# Patient Record
Sex: MALE | Race: BLACK OR AFRICAN AMERICAN | NOT HISPANIC OR LATINO | URBAN - METROPOLITAN AREA
[De-identification: names, ages, dates, MRNs, and addresses within clinical notes are randomized per-mention and may not be internally consistent; named-entity substitution may affect disease eponyms.]

---

## 2020-08-23 ENCOUNTER — EMERGENCY (EMERGENCY)
Facility: HOSPITAL | Age: 50
LOS: 1 days | Discharge: HOME | End: 2020-08-23
Attending: EMERGENCY MEDICINE | Admitting: EMERGENCY MEDICINE
Payer: MEDICARE

## 2020-08-23 VITALS
DIASTOLIC BLOOD PRESSURE: 81 MMHG | TEMPERATURE: 100 F | SYSTOLIC BLOOD PRESSURE: 145 MMHG | RESPIRATION RATE: 18 BRPM | WEIGHT: 250 LBS | OXYGEN SATURATION: 100 % | HEART RATE: 80 BPM

## 2020-08-23 VITALS
DIASTOLIC BLOOD PRESSURE: 87 MMHG | SYSTOLIC BLOOD PRESSURE: 133 MMHG | HEART RATE: 71 BPM | OXYGEN SATURATION: 99 % | TEMPERATURE: 99 F | RESPIRATION RATE: 17 BRPM

## 2020-08-23 DIAGNOSIS — F20.89 OTHER SCHIZOPHRENIA: ICD-10-CM

## 2020-08-23 LAB
ALBUMIN SERPL ELPH-MCNC: 4 G/DL — SIGNIFICANT CHANGE UP (ref 3.3–5)
ALP SERPL-CCNC: 79 U/L — SIGNIFICANT CHANGE UP (ref 40–120)
ALT FLD-CCNC: 41 U/L — SIGNIFICANT CHANGE UP (ref 10–45)
ANION GAP SERPL CALC-SCNC: 12 MMOL/L — SIGNIFICANT CHANGE UP (ref 5–17)
APAP SERPL-MCNC: <5 UG/ML — LOW (ref 10–30)
APPEARANCE UR: CLEAR — SIGNIFICANT CHANGE UP
AST SERPL-CCNC: 21 U/L — SIGNIFICANT CHANGE UP (ref 10–40)
BASOPHILS # BLD AUTO: 0.03 K/UL — SIGNIFICANT CHANGE UP (ref 0–0.2)
BASOPHILS NFR BLD AUTO: 0.4 % — SIGNIFICANT CHANGE UP (ref 0–2)
BILIRUB SERPL-MCNC: 0.4 MG/DL — SIGNIFICANT CHANGE UP (ref 0.2–1.2)
BILIRUB UR-MCNC: NEGATIVE — SIGNIFICANT CHANGE UP
BUN SERPL-MCNC: 14 MG/DL — SIGNIFICANT CHANGE UP (ref 7–23)
CALCIUM SERPL-MCNC: 8.9 MG/DL — SIGNIFICANT CHANGE UP (ref 8.4–10.5)
CHLORIDE SERPL-SCNC: 98 MMOL/L — SIGNIFICANT CHANGE UP (ref 96–108)
CO2 SERPL-SCNC: 23 MMOL/L — SIGNIFICANT CHANGE UP (ref 22–31)
COLOR SPEC: YELLOW — SIGNIFICANT CHANGE UP
CREAT SERPL-MCNC: 0.95 MG/DL — SIGNIFICANT CHANGE UP (ref 0.5–1.3)
DIFF PNL FLD: NEGATIVE — SIGNIFICANT CHANGE UP
EOSINOPHIL # BLD AUTO: 0.04 K/UL — SIGNIFICANT CHANGE UP (ref 0–0.5)
EOSINOPHIL NFR BLD AUTO: 0.6 % — SIGNIFICANT CHANGE UP (ref 0–6)
ETHANOL SERPL-MCNC: <10 MG/DL — SIGNIFICANT CHANGE UP (ref 0–10)
GLUCOSE SERPL-MCNC: 152 MG/DL — HIGH (ref 70–99)
GLUCOSE UR QL: NEGATIVE — SIGNIFICANT CHANGE UP
HCT VFR BLD CALC: 46.1 % — SIGNIFICANT CHANGE UP (ref 39–50)
HGB BLD-MCNC: 14.9 G/DL — SIGNIFICANT CHANGE UP (ref 13–17)
IMM GRANULOCYTES NFR BLD AUTO: 0.4 % — SIGNIFICANT CHANGE UP (ref 0–1.5)
KETONES UR-MCNC: NEGATIVE — SIGNIFICANT CHANGE UP
LEUKOCYTE ESTERASE UR-ACNC: NEGATIVE — SIGNIFICANT CHANGE UP
LYMPHOCYTES # BLD AUTO: 2.49 K/UL — SIGNIFICANT CHANGE UP (ref 1–3.3)
LYMPHOCYTES # BLD AUTO: 36.1 % — SIGNIFICANT CHANGE UP (ref 13–44)
MCHC RBC-ENTMCNC: 31 PG — SIGNIFICANT CHANGE UP (ref 27–34)
MCHC RBC-ENTMCNC: 32.3 GM/DL — SIGNIFICANT CHANGE UP (ref 32–36)
MCV RBC AUTO: 95.8 FL — SIGNIFICANT CHANGE UP (ref 80–100)
MONOCYTES # BLD AUTO: 0.48 K/UL — SIGNIFICANT CHANGE UP (ref 0–0.9)
MONOCYTES NFR BLD AUTO: 7 % — SIGNIFICANT CHANGE UP (ref 2–14)
NEUTROPHILS # BLD AUTO: 3.82 K/UL — SIGNIFICANT CHANGE UP (ref 1.8–7.4)
NEUTROPHILS NFR BLD AUTO: 55.5 % — SIGNIFICANT CHANGE UP (ref 43–77)
NITRITE UR-MCNC: NEGATIVE — SIGNIFICANT CHANGE UP
NRBC # BLD: 0 /100 WBCS — SIGNIFICANT CHANGE UP (ref 0–0)
PCP SPEC-MCNC: SIGNIFICANT CHANGE UP
PH UR: 6 — SIGNIFICANT CHANGE UP (ref 5–8)
PLATELET # BLD AUTO: 145 K/UL — LOW (ref 150–400)
POTASSIUM SERPL-MCNC: 4 MMOL/L — SIGNIFICANT CHANGE UP (ref 3.5–5.3)
POTASSIUM SERPL-SCNC: 4 MMOL/L — SIGNIFICANT CHANGE UP (ref 3.5–5.3)
PROT SERPL-MCNC: 7.2 G/DL — SIGNIFICANT CHANGE UP (ref 6–8.3)
PROT UR-MCNC: NEGATIVE MG/DL — SIGNIFICANT CHANGE UP
RBC # BLD: 4.81 M/UL — SIGNIFICANT CHANGE UP (ref 4.2–5.8)
RBC # FLD: 11.6 % — SIGNIFICANT CHANGE UP (ref 10.3–14.5)
SALICYLATES SERPL-MCNC: <0.3 MG/DL — LOW (ref 2.8–20)
SARS-COV-2 RNA SPEC QL NAA+PROBE: SIGNIFICANT CHANGE UP
SODIUM SERPL-SCNC: 133 MMOL/L — LOW (ref 135–145)
SP GR SPEC: 1.02 — SIGNIFICANT CHANGE UP (ref 1–1.03)
UROBILINOGEN FLD QL: 0.2 E.U./DL — SIGNIFICANT CHANGE UP
WBC # BLD: 6.89 K/UL — SIGNIFICANT CHANGE UP (ref 3.8–10.5)
WBC # FLD AUTO: 6.89 K/UL — SIGNIFICANT CHANGE UP (ref 3.8–10.5)

## 2020-08-23 PROCEDURE — 80307 DRUG TEST PRSMV CHEM ANLYZR: CPT

## 2020-08-23 PROCEDURE — 36415 COLL VENOUS BLD VENIPUNCTURE: CPT

## 2020-08-23 PROCEDURE — 99285 EMERGENCY DEPT VISIT HI MDM: CPT

## 2020-08-23 PROCEDURE — 85025 COMPLETE CBC W/AUTO DIFF WBC: CPT

## 2020-08-23 PROCEDURE — 87635 SARS-COV-2 COVID-19 AMP PRB: CPT

## 2020-08-23 PROCEDURE — 99284 EMERGENCY DEPT VISIT MOD MDM: CPT | Mod: 25

## 2020-08-23 PROCEDURE — 81003 URINALYSIS AUTO W/O SCOPE: CPT

## 2020-08-23 PROCEDURE — 80053 COMPREHEN METABOLIC PANEL: CPT

## 2020-08-23 NOTE — ED PROVIDER NOTE - CLINICAL SUMMARY MEDICAL DECISION MAKING FREE TEXT BOX
48 yo m with pmh of htn, dm, schizophrenia c/o SI x 2 weeks. Reports feeling depressed because he is bored and has nothing to do. Pt stopped taking his haldol and wellbutrin 3 weeks ago because he did not feel like they were doing anything for him. Reports plan would be to cut himself. VSS. Well appearing. PE unremarkable. Labs, psyc eval.

## 2020-08-23 NOTE — ED BEHAVIORAL HEALTH NOTE - BEHAVIORAL HEALTH NOTE
PRE-HOSPITAL COURSE:  SOURCE:  Second-hand information via EMR documentation.   DETAILS: Patient self-presented to the ED with no noted incidents.  ===================  ED COURSE:  SOURCE:  Second-hand information via EMR documentation.  ARRIVAL:  Patient self-presented to the ED with no noted incidents.   BELONGINGS:  Clothing; nothing of note in belongings.   BEHAVIOR:  Upon arrival patient reported feeling increasingly depressed since he is home not working or doing anything all day. Patient endorsed SI with plan to cut himself, which he has done in the past. Patient is noted to appear depressed, is ambulating w/o assistance, is AXO4, judgement/insight/memory/speech/thought process WNL, presents with fair hygiene/grooming. Complied with triage protocols – provided blood/urine, changed into a hospital gown and allowed for wanding without incident. No behavioral issues reported.   ========================  COLLATERAL   ========================  NAME: Jacoby George  NUMBER: [phone]  RELATIONSHIP:  Brother  COMMENTS:  Made an attempt to contact brother at number provided with no success, left a voicemail requesting a phone call back.

## 2020-08-23 NOTE — ED BEHAVIORAL HEALTH ASSESSMENT NOTE - RISK ASSESSMENT
chronic elevated risk factors are history of psychotic illness, history of hospitalization, history of suicide attempt, and social isolation, acute risk factors are current med non-adherence, vague suicidal thought earlier today, however protective factors are eagerness to re-engage in care, ability to safety plan, future orientation in both short term and long term, lack of currently professed suicidal ideation. Low Acute Suicide Risk

## 2020-08-23 NOTE — ED PROVIDER NOTE - ATTENDING CONTRIBUTION TO CARE
as per HPI. Vitals wnl, exam as above.   In ED: mild hyponatremia, other labs grossly wnl. Evaluated by psych.  Cleared by psych.   Clinically no indication for further emergent ED workup or hospitalization at this time. Comfortable for dc, outpt f/u.

## 2020-08-23 NOTE — ED BEHAVIORAL HEALTH ASSESSMENT NOTE - OTHER PAST PSYCHIATRIC HISTORY (INCLUDE DETAILS REGARDING ONSET, COURSE OF ILLNESS, INPATIENT/OUTPATIENT TREATMENT)
>10 lifetime hospitalizations, most recently [facility] in 2020  1 SA in 2006 by hanging  NSSIB as recently as last year  Current outpt treatment with [facility] at [address]

## 2020-08-23 NOTE — ED BEHAVIORAL HEALTH ASSESSMENT NOTE - SAFETY PLAN DETAILS
discussed coping strategies with patient including to return to the emergnecy room immediately if suicidal thoughts recur. patient is in agreement with plan.

## 2020-08-23 NOTE — ED BEHAVIORAL HEALTH ASSESSMENT NOTE - SUMMARY
Patient is a 49y man, single, noncaregiver, domiciled in private residence with roommate, PMHx HTN, HLD, DM, Asthma, PPHx of schizoaffective disorder, >10 lifetime hospitalizations, most recently [facility] in 2020, who has 1 past suicide attempt in 2006, past NSSIB, no known legal/violence history, who self presented today with subjective depression, boredom and a suicidal thought. On exam he very clearly identifies boredom and social isolation as his primary issues, and while noting he had a fleeting suicidal thought earlier today, no longer feels suicidal and was able to distract himself. He is future oriented in both short and long term, plans to go home tonight and take medication and re-engage in outpatient care. He is planning to  his [employer] badge to return to work. Given that his self-professed main issue right now is feeling useless and bored, hospitalization would actually be likely to do more harm than good, as it could potentially disrupt his plans to do work for the [employer]. Additionally, he has a very stable mental status with a euthymic albeit blunted affect, no evidence of psychosis, no responding to internal stimuli, and currently denying low mood and suicidal ideation.

## 2020-08-23 NOTE — ED PROVIDER NOTE - PATIENT PORTAL LINK FT
You can access the FollowMyHealth Patient Portal offered by [facility] by registering at the following website: [email or web address]. By joining [employer]’s FollowMyHealth portal, you will also be able to view your health information using other applications (apps) compatible with our system.

## 2020-08-23 NOTE — ED PROVIDER NOTE - NSFOLLOWUPINSTRUCTIONS_ED_ALL_ED_FT
Depression    WHAT YOU NEED TO KNOW:    Depression is a medical condition that causes feelings of sadness or hopelessness that do not go away. Depression may cause you to lose interest in things you used to enjoy. These feelings may interfere with your daily life.    DISCHARGE INSTRUCTIONS:    Call your local emergency number (911 in the ) if:     You think about harming yourself or someone else.      You have done something on purpose to hurt yourself.    Call your therapist or doctor if:     Your symptoms do not improve.      You cannot make it to your next appointment.       You have new symptoms.      You have questions or concerns about your condition or care.    The following resources are available at any time to help you, if needed:     National Suicide Prevention Lifeline: 1-107.675.7739 (8-201-160-TALK)      Suicide Hotline: 1-254.327.4880 (5-021-PHKRIOL)      For a list of international numbers: https://[employer].org/find-help/international-resources/    Medicines:     Antidepressants may be given to improve or balance your mood. You may need to take this medicine for several weeks before you begin to feel better.      Take your medicine as directed. Contact your healthcare provider if you think your medicine is not helping or if you have side effects. Tell him of her if you are allergic to any medicine. Keep a list of the medicines, vitamins, and herbs you take. Include the amounts, and when and why you take them. Bring the list or the pill bottles to follow-up visits. Carry your medicine list with you in case of an emergency.    Therapy is often used together with medicine to relieve depression. Therapy is a way for you to talk about your feelings and anything that may be causing depression. Therapy can be done alone or in a group. It may also be done with family members or a significant other.    Self-care:     Get regular physical activity. Try to be active for 30 minutes, 3 to 5 days a week. Physical activity can help relieve depression. Work with your healthcare provider to develop a plan that you enjoy. It may help to ask someone to be active with you.      Create a regular sleep schedule. A routine can help you relax before bed. Listen to music, read, or do yoga. Try to go to bed and wake up at the same time every day. Sleep is important for emotional health.      Eat a variety of healthy foods. Healthy foods include fruits, vegetables, whole-grain breads, low-fat dairy products, lean meats, fish, and cooked beans. A healthy meal plan is low in fat, salt, and added sugar.      Do not drink alcohol or use drugs. Alcohol and drugs can make depression worse. Talk to your therapist or doctor if you need help quitting.    Follow up with your healthcare provider as directed: Your healthcare provider will monitor your progress at follow-up visits. He or she will also monitor your medicine if you take antidepressants. Your healthcare provider will ask if the medicine is helping. Tell him or her about any side effects or problems you may have with your medicine. The type or amount of medicine may need to be changed. Write down your questions so you remember to ask them during your visits.

## 2020-08-23 NOTE — ED BEHAVIORAL HEALTH ASSESSMENT NOTE - HPI (INCLUDE ILLNESS QUALITY, SEVERITY, DURATION, TIMING, CONTEXT, MODIFYING FACTORS, ASSOCIATED SIGNS AND SYMPTOMS)
Patient is a 49y man, single, noncaregiver, domiciled in private residence with roommate, PMHx HTN, HLD, DM, Asthma, PPHx of schizoaffective disorder, >10 lifetime hospitalizations, most recently [facility] in 2020, who has 1 past suicide attempt in 2006, past NSSIB, no known legal/violence history, who self presented today with subjective depression, boredom and a suicidal thought.     Baseline, patient takes haldol 10mg and wellbutrin 300mg and follows with [facility] at [address] [place] [address] with Ms. Dasia Aburto. He says that about 3 months ago, he stopped taking his medication, and says he "doesn't know" why. He says prior to this he felt "alright", but afterward slowly started experiencing depressive symptoms. He said he was feeling depressed, started isolating himself, doesn't socialize much, and feels useless because he wants to be working. He says he sometimes has hard time falling asleep and eats more takeout. He has had occasional suicidal thoughts. One he had about 3-4 weeks ago. He reports going to [facility] ED at that time, and was discharged. Afterward, he followed up with his psychiatrist by phone and refills of his medications were brought to his house. He is not sure why he didn't resume taking them. Today, he said that he was in his kitchen holding a knife when he had a brief thought of cutting himself (he engaged in NSSIB by cutting last year, according to him), but was able to distract himself from the thought, put the knife back, and presented to the emergency room. At this point, he clearly says, "I'm just bored and I need something to do." He mentions that he is supposed to be doing work for the [employer] and has already completed a new orientation and just needs to  a badge tomorrow so he can begin work. He is eager to do this. He no longer feels any suicidal ideation, no homicidal ideation, no auditory or visual hallucinations. We discuss how he has full medication bottles at home and he plans to take a haldol as soon as he gets home tonight. He is looking forward to picking up his [email or web address] badge tomorrow. We discussed voluntary admission but he declined, preferring to re-engage in outpatient treatment.

## 2020-08-23 NOTE — ED ADULT NURSE NOTE - OBJECTIVE STATEMENT
pt a&ox3 resting comfortably in lounge chair. pt c/o SI x 2 weeks.  hx of schizophrenia. pt reports increase in feeling depressed as he is home and not working/ doing anything, reports that he is feeling like killing himself. reports plan would be to cut himself. reports prior attempt where he cut himself in the chest. prescribed welbutrin and haldol that pt reports is delivered by a visiting nurse, but he hasn't taken them in 3 weeks because he doesn't feel they are helping him.  c/o not being able to sleep. reports family as his support system, and that he lives with roommates in an apartment. denies sob, cp, n ,v d,fevers, cough, HA, weakness, Pain. denies etoh use/ drug use.,denies auditory and visual hallucinations

## 2020-08-23 NOTE — ED PROVIDER NOTE - OBJECTIVE STATEMENT
50 yo m with pmh of htn, dm, schizophrenia c/o SI x 2 weeks. Reports feeling depressed because he is bored and has nothing to do. Pt stopped taking his haldol and wellbutrin 3 weeks ago because he did not feel like they were doing anything for him. Reports plan would be to cut himself. Pt states he attempted in the past and cut his chest. Does not sleep well. Pt lives with roommates and has support from his family. Denies fever, chills, cp, sob, ha, abd pain. Denies etoh or drug use. 50 yo m with pmh of htn, dm, schizophrenia c/o SI x 2 weeks. Reports feeling depressed because he is bored and has nothing to do. Pt stopped taking his haldol and wellbutrin 3 weeks ago because he did not feel like they were doing anything for him. Reports plan would be to cut himself. Pt states he attempted in the past and cut his chest. Does not sleep well. Pt lives with roommates and has support from his family. Denies fever, chills, cp, sob, ha, abd pain. Denies etoh or drug use. Denies AH/VH.

## 2020-08-27 DIAGNOSIS — R45.851 SUICIDAL IDEATIONS: ICD-10-CM

## 2020-08-27 DIAGNOSIS — Z20.828 CONTACT WITH AND (SUSPECTED) EXPOSURE TO OTHER VIRAL COMMUNICABLE DISEASES: ICD-10-CM

## 2020-08-27 DIAGNOSIS — F32.9 MAJOR DEPRESSIVE DISORDER, SINGLE EPISODE, UNSPECIFIED: ICD-10-CM

## 2020-09-14 ENCOUNTER — EMERGENCY (EMERGENCY)
Facility: HOSPITAL | Age: 50
LOS: 1 days | Discharge: HOME | End: 2020-09-14
Attending: EMERGENCY MEDICINE | Admitting: EMERGENCY MEDICINE
Payer: MEDICARE

## 2020-09-14 VITALS
WEIGHT: 300.05 LBS | OXYGEN SATURATION: 100 % | DIASTOLIC BLOOD PRESSURE: 85 MMHG | HEART RATE: 74 BPM | TEMPERATURE: 98 F | RESPIRATION RATE: 18 BRPM | SYSTOLIC BLOOD PRESSURE: 133 MMHG | HEIGHT: 72 IN

## 2020-09-14 DIAGNOSIS — F25.1 SCHIZOAFFECTIVE DISORDER, DEPRESSIVE TYPE: ICD-10-CM

## 2020-09-14 DIAGNOSIS — R45.851 SUICIDAL IDEATIONS: ICD-10-CM

## 2020-09-14 DIAGNOSIS — Z20.828 CONTACT WITH AND (SUSPECTED) EXPOSURE TO OTHER VIRAL COMMUNICABLE DISEASES: ICD-10-CM

## 2020-09-14 DIAGNOSIS — F32.9 MAJOR DEPRESSIVE DISORDER, SINGLE EPISODE, UNSPECIFIED: ICD-10-CM

## 2020-09-14 LAB
AMPHET UR-MCNC: NEGATIVE — SIGNIFICANT CHANGE UP
ANION GAP SERPL CALC-SCNC: 11 MMOL/L — SIGNIFICANT CHANGE UP (ref 5–17)
APPEARANCE UR: CLEAR — SIGNIFICANT CHANGE UP
BARBITURATES UR SCN-MCNC: NEGATIVE — SIGNIFICANT CHANGE UP
BASOPHILS # BLD AUTO: 0.03 K/UL — SIGNIFICANT CHANGE UP (ref 0–0.2)
BASOPHILS NFR BLD AUTO: 0.5 % — SIGNIFICANT CHANGE UP (ref 0–2)
BENZODIAZ UR-MCNC: NEGATIVE — SIGNIFICANT CHANGE UP
BILIRUB UR-MCNC: NEGATIVE — SIGNIFICANT CHANGE UP
BUN SERPL-MCNC: 12 MG/DL — SIGNIFICANT CHANGE UP (ref 7–23)
CALCIUM SERPL-MCNC: 9.5 MG/DL — SIGNIFICANT CHANGE UP (ref 8.4–10.5)
CHLORIDE SERPL-SCNC: 103 MMOL/L — SIGNIFICANT CHANGE UP (ref 96–108)
CO2 SERPL-SCNC: 27 MMOL/L — SIGNIFICANT CHANGE UP (ref 22–31)
COCAINE METAB.OTHER UR-MCNC: NEGATIVE — SIGNIFICANT CHANGE UP
COLOR SPEC: YELLOW — SIGNIFICANT CHANGE UP
CREAT SERPL-MCNC: 1 MG/DL — SIGNIFICANT CHANGE UP (ref 0.5–1.3)
DIFF PNL FLD: NEGATIVE — SIGNIFICANT CHANGE UP
EOSINOPHIL # BLD AUTO: 0.02 K/UL — SIGNIFICANT CHANGE UP (ref 0–0.5)
EOSINOPHIL NFR BLD AUTO: 0.3 % — SIGNIFICANT CHANGE UP (ref 0–6)
ETHANOL SERPL-MCNC: <10 MG/DL — SIGNIFICANT CHANGE UP (ref 0–10)
GLUCOSE SERPL-MCNC: 95 MG/DL — SIGNIFICANT CHANGE UP (ref 70–99)
GLUCOSE UR QL: NEGATIVE — SIGNIFICANT CHANGE UP
HCT VFR BLD CALC: 50.4 % — HIGH (ref 39–50)
HGB BLD-MCNC: 16.1 G/DL — SIGNIFICANT CHANGE UP (ref 13–17)
IMM GRANULOCYTES NFR BLD AUTO: 0.3 % — SIGNIFICANT CHANGE UP (ref 0–1.5)
KETONES UR-MCNC: NEGATIVE — SIGNIFICANT CHANGE UP
LEUKOCYTE ESTERASE UR-ACNC: NEGATIVE — SIGNIFICANT CHANGE UP
LYMPHOCYTES # BLD AUTO: 2.09 K/UL — SIGNIFICANT CHANGE UP (ref 1–3.3)
LYMPHOCYTES # BLD AUTO: 35.5 % — SIGNIFICANT CHANGE UP (ref 13–44)
MCHC RBC-ENTMCNC: 30.6 PG — SIGNIFICANT CHANGE UP (ref 27–34)
MCHC RBC-ENTMCNC: 31.9 GM/DL — LOW (ref 32–36)
MCV RBC AUTO: 95.8 FL — SIGNIFICANT CHANGE UP (ref 80–100)
METHADONE UR-MCNC: NEGATIVE — SIGNIFICANT CHANGE UP
MONOCYTES # BLD AUTO: 0.53 K/UL — SIGNIFICANT CHANGE UP (ref 0–0.9)
MONOCYTES NFR BLD AUTO: 9 % — SIGNIFICANT CHANGE UP (ref 2–14)
NEUTROPHILS # BLD AUTO: 3.2 K/UL — SIGNIFICANT CHANGE UP (ref 1.8–7.4)
NEUTROPHILS NFR BLD AUTO: 54.4 % — SIGNIFICANT CHANGE UP (ref 43–77)
NITRITE UR-MCNC: NEGATIVE — SIGNIFICANT CHANGE UP
NRBC # BLD: 0 /100 WBCS — SIGNIFICANT CHANGE UP (ref 0–0)
OPIATES UR-MCNC: NEGATIVE — SIGNIFICANT CHANGE UP
PCP SPEC-MCNC: SIGNIFICANT CHANGE UP
PCP UR-MCNC: NEGATIVE — SIGNIFICANT CHANGE UP
PH UR: 6 — SIGNIFICANT CHANGE UP (ref 5–8)
PLATELET # BLD AUTO: 165 K/UL — SIGNIFICANT CHANGE UP (ref 150–400)
POTASSIUM SERPL-MCNC: 4.2 MMOL/L — SIGNIFICANT CHANGE UP (ref 3.5–5.3)
POTASSIUM SERPL-SCNC: 4.2 MMOL/L — SIGNIFICANT CHANGE UP (ref 3.5–5.3)
PROT UR-MCNC: NEGATIVE MG/DL — SIGNIFICANT CHANGE UP
RBC # BLD: 5.26 M/UL — SIGNIFICANT CHANGE UP (ref 4.2–5.8)
RBC # FLD: 11.9 % — SIGNIFICANT CHANGE UP (ref 10.3–14.5)
SARS-COV-2 RNA SPEC QL NAA+PROBE: SIGNIFICANT CHANGE UP
SODIUM SERPL-SCNC: 141 MMOL/L — SIGNIFICANT CHANGE UP (ref 135–145)
SP GR SPEC: 1.02 — SIGNIFICANT CHANGE UP (ref 1–1.03)
THC UR QL: NEGATIVE — SIGNIFICANT CHANGE UP
UROBILINOGEN FLD QL: 0.2 E.U./DL — SIGNIFICANT CHANGE UP
WBC # BLD: 5.89 K/UL — SIGNIFICANT CHANGE UP (ref 3.8–10.5)
WBC # FLD AUTO: 5.89 K/UL — SIGNIFICANT CHANGE UP (ref 3.8–10.5)

## 2020-09-14 PROCEDURE — 90792 PSYCH DIAG EVAL W/MED SRVCS: CPT | Mod: 95

## 2020-09-14 PROCEDURE — 80048 BASIC METABOLIC PNL TOTAL CA: CPT

## 2020-09-14 PROCEDURE — 85025 COMPLETE CBC W/AUTO DIFF WBC: CPT

## 2020-09-14 PROCEDURE — 80307 DRUG TEST PRSMV CHEM ANLYZR: CPT

## 2020-09-14 PROCEDURE — 93010 ELECTROCARDIOGRAM REPORT: CPT

## 2020-09-14 PROCEDURE — 36415 COLL VENOUS BLD VENIPUNCTURE: CPT

## 2020-09-14 PROCEDURE — 99285 EMERGENCY DEPT VISIT HI MDM: CPT

## 2020-09-14 PROCEDURE — 81003 URINALYSIS AUTO W/O SCOPE: CPT

## 2020-09-14 PROCEDURE — 93005 ELECTROCARDIOGRAM TRACING: CPT

## 2020-09-14 NOTE — ED PROVIDER NOTE - PSYCHIATRIC, MLM
Notes plan to harm himself/commit suicide, on 1-1. Alert and oriented to person, place, time/situation. normal mood and affect.

## 2020-09-14 NOTE — ED PROVIDER NOTE - CLINICAL SUMMARY MEDICAL DECISION MAKING FREE TEXT BOX
48 y/o M with PMHx HTN DM schizoaffective disorder, noncompliant with medications here with SI and depression, specific plans for SI. Will get labs, consult psych. 50 y/o M with PMHx HTN DM schizoaffective disorder, noncompliant with medications here with SI and depression, specific plans for SI. Will get labs, consult psych- call to [facility] at 8:23- they are doing "hand-off" and are unavailable for 30 minutes 48 y/o M with PMHx HTN DM schizoaffective disorder, noncompliant with medications here with SI and depression, specific plans for SI. Will get labs, consult psych- signout to Dr Freitas- telepsych at 850- they will reeval 48 y/o M with PMHx HTN DM schizoaffective disorder, noncompliant with medications here with SI and depression, specific plans for SI. Will get labs, consult psych- signout to Dr Freitas- telepsych at 850- they will reeval  accepted by Psych - will need a bed in the morning 50 y/o M with PMHx HTN DM schizoaffective disorder, noncompliant with medications here with SI and depression, specific plans for SI. Will get labs, consult psych- signout to Dr Freitas- telepsych at 850- they will reeval  accepted by Psych - will need a bed in the morning    Patient is stable and cooperative. He was accepted by [facility] and transferred.

## 2020-09-14 NOTE — ED BEHAVIORAL HEALTH ASSESSMENT NOTE - HPI (INCLUDE ILLNESS QUALITY, SEVERITY, DURATION, TIMING, CONTEXT, MODIFYING FACTORS, ASSOCIATED SIGNS AND SYMPTOMS)
Patient is a 49y man, single, noncaregiver, domiciled in private residence with roommate, PMHx HTN, HLD, DM, Asthma, PPHx of schizoaffective disorder, >10 lifetime hospitalizations, most recently [facility] in 2020, who has 1 past suicide attempt in 2006, past NSSIB, no known legal/violence history, who self presented today with subjective depression and an unsuccessful suicide attempt 1 week ago.    Pt last seen 8/23 in this emergency room, at which point he described relapse of some depressive symptoms in the setting of medication non-adherence, but he was excited to start a job at the [employer] and requested discharge so he could restart meds at home and begin work. He was discharged that night. He said the next day he went to  his [employer] badge, but "I wasn't doing anything so I quit". He was disappointed that the job didn't work out. He said that since that time he has been very depressed because he wants something to do every day, like work or school. He attempted suicide by suffocation about 1 week ago. He said that in his bedroom at home, he put a bag over his head and a belt around his neck and tightened it. He laid there for about 15 minutes at which point he said, "it wasn't working because I was still alive", so he gave up. He had other thoughts of suicide method in the past two weeks such as drinking bleach or cutting himself with a knife. His sleep/wake schedule has been reversed. He is up all night, he says he looks out the window and thinks of jumping. When I ask what stops him he concretely answers "A gate on the window." He said at one point he went to [facility] ED but was discharged. He did have one phone call with his outpatient psychiatrist but did not disclose his suicide attempt saying "I didn't tell them because I did not want them to put me back on AOT."     Here in the emergency room, he remains suicidal. In fact he says , "I was thinking about locking myself in the bathroom and putting the bag over my head for patient belongings, but I figured they would call security on me." He feels more secure with 1:1 observation and does not think he can be left alone. He reports AH last week ("you're a loser"), but denies AH/VH today. Denies HI. Denies drugs/etoh.

## 2020-09-14 NOTE — ED BEHAVIORAL HEALTH ASSESSMENT NOTE - OTHER PAST PSYCHIATRIC HISTORY (INCLUDE DETAILS REGARDING ONSET, COURSE OF ILLNESS, INPATIENT/OUTPATIENT TREATMENT)
> 10 lifetime hospitalizations, most recently [facility] in 2020  1 SA in 2006 by hanging  Reported unsuccessful SA by suffocation last week, 9/2020  NSSIB as recently as last year 2019  Outpt treatment at [facility] [address]

## 2020-09-14 NOTE — ED ADULT NURSE NOTE - HPI (INCLUDE ILLNESS QUALITY, SEVERITY, DURATION, TIMING, CONTEXT, MODIFYING FACTORS, ASSOCIATED SIGNS AND SYMPTOMS)
Patient presents AOX4 c/o SI, stating "I don't want to live". Patient reports "I have not taken my medications in months because I don't want to live". Denies AH/VH/HI. Speaking in full, complete sentences. Appropriate eye contact made. Patient sites lack of work/finding a job to be a major stressor. Patient reports his plan would be to suffocate himself. Denies ETOH/drug use.

## 2020-09-14 NOTE — ED BEHAVIORAL HEALTH NOTE - BEHAVIORAL HEALTH NOTE
===================  PRE-HOSPITAL COURSE  ===================  SOURCE:  Triage documentation.   DETAILS:  Patient presented self to ED; chief complaint of SI with plan to suffocate self.     ============  ED COURSE   ============  SOURCE:  Triage/nurse documentation.   ARRIVAL:  Patient was calm and cooperative upon arrival and allowed for gowning/wanding without noted incident. Patient presents with good hygeine/grooming.   BELONGINGS:  None notable, clothing stowed with security.   BEHAVIOR: Patient has been calm and cooperative while in ED and has provided blood/urine without incident. Patient endorses SI and plan to suffocate self, denies HI/AH/VH. Major stressor in life is inability to secure work. Patient’s speech is of normal volume/rate accompanied by a logical thought process; patient is AOx4. Patient makes appropriate eye contact.   TREATMENT:  Patient has not required medication intervention while in ED.   VISITORS:  Patient unaccompanied by social supports while in ED.       COVID Exposure Screen- collateral (i.e. third-party, chart review, belongings, etc; include EMS and ED staff)     1.        *In the past 14 days, has the patient been around anyone with a positive COVID-19 test?*  (  ) Yes   ( X) No   (  ) Unknown- Reason (e.g. collateral uncertain, refusing to answer, etc.):  ______  IF YES PROCEED TO QUESTION #2. IF NO or UNKNOWN, PLEASE SKIP TO QUESTION #7  2.        Was the patient within 6 feet of them for at least 15 minutes? (  ) Yes   (  ) No   (  ) Unknown- Reason: ______   3.        Did the patient provide care for them? (  ) Yes   (  ) No   (  ) Unknown- Reason: ______   4.        Did the patient have direct physical contact with them (touched, hugged, or kissed them)? (  ) Yes   (  ) No    (  ) Unknown- Reason: ______   5.        Did the patient share eating or drinking utensils with them? (  ) Yes   (  ) No    (  ) Unknown- Reason: ______   6.        Have they sneezed, coughed, or somehow got respiratory droplets on the patient? (  ) Yes   (  ) No    (  ) Unknown- Reason: ______   7.        *Has the patient been out of [place] within the past 14 days?*  (  ) Yes   ( X) No   (  ) Unknown- Reason (e.g. collateral uncertain, refusing to answer, etc.): _______  IF YES PLEASE ANSWER THE FOLLOWING QUESTIONS:  8.        Which state/country has the patient been to? ______   9.        Was the patient there over 24 hours? (  ) Yes   (  ) No    (  ) Unknown- Reason: ______   10.     What date did the patient return to [facility]? ______

## 2020-09-14 NOTE — ED PROVIDER NOTE - OBJECTIVE STATEMENT
48 y/o M with PMHx of schizoaffective disorder, HTN, HLD, noncompliant with medications (notes not taken for a few weeks), presents to the ED with suicidal ideations for more than a month, thoughts of harming himself, depression. Patient notes that he wants to hurt himself by drowning and shows scars of where he cut himself. Lives with roommates, which he is friendly with, currently unemployed. No HI, denies current hallucinations, or any physical complaints.

## 2020-09-15 ENCOUNTER — INPATIENT (INPATIENT)
Facility: HOSPITAL | Age: 50
LOS: 2 days | Discharge: HOME | End: 2020-09-18
Attending: PSYCHIATRY & NEUROLOGY | Admitting: PSYCHIATRY & NEUROLOGY
Payer: MEDICARE

## 2020-09-15 VITALS
HEART RATE: 70 BPM | RESPIRATION RATE: 17 BRPM | OXYGEN SATURATION: 98 % | TEMPERATURE: 98 F | SYSTOLIC BLOOD PRESSURE: 130 MMHG | DIASTOLIC BLOOD PRESSURE: 78 MMHG

## 2020-09-15 VITALS — OXYGEN SATURATION: 97 % | HEIGHT: 70 IN | RESPIRATION RATE: 16 BRPM | WEIGHT: 294.1 LBS | TEMPERATURE: 98 F

## 2020-09-15 DIAGNOSIS — F33.9 MAJOR DEPRESSIVE DISORDER, RECURRENT, UNSPECIFIED: ICD-10-CM

## 2020-09-15 PROCEDURE — 99232 SBSQ HOSP IP/OBS MODERATE 35: CPT

## 2020-09-15 RX ORDER — DIPHENHYDRAMINE HCL 50 MG
50 CAPSULE ORAL ONCE
Refills: 0 | Status: DISCONTINUED | OUTPATIENT
Start: 2020-09-15 | End: 2020-09-18

## 2020-09-15 RX ORDER — METFORMIN HYDROCHLORIDE 850 MG/1
500 TABLET ORAL
Refills: 0 | Status: DISCONTINUED | OUTPATIENT
Start: 2020-09-15 | End: 2020-09-18

## 2020-09-15 RX ORDER — HALOPERIDOL DECANOATE 100 MG/ML
5 INJECTION INTRAMUSCULAR ONCE
Refills: 0 | Status: DISCONTINUED | OUTPATIENT
Start: 2020-09-15 | End: 2020-09-18

## 2020-09-15 RX ORDER — INSULIN LISPRO 100/ML
VIAL (ML) SUBCUTANEOUS
Refills: 0 | Status: DISCONTINUED | OUTPATIENT
Start: 2020-09-15 | End: 2020-09-18

## 2020-09-15 RX ORDER — HALOPERIDOL DECANOATE 100 MG/ML
5 INJECTION INTRAMUSCULAR EVERY 6 HOURS
Refills: 0 | Status: DISCONTINUED | OUTPATIENT
Start: 2020-09-15 | End: 2020-09-18

## 2020-09-15 RX ORDER — DEXTROSE 50 % IN WATER 50 %
15 SYRINGE (ML) INTRAVENOUS ONCE
Refills: 0 | Status: DISCONTINUED | OUTPATIENT
Start: 2020-09-15 | End: 2020-09-18

## 2020-09-15 RX ORDER — DIPHENHYDRAMINE HCL 50 MG
50 CAPSULE ORAL EVERY 6 HOURS
Refills: 0 | Status: DISCONTINUED | OUTPATIENT
Start: 2020-09-15 | End: 2020-09-18

## 2020-09-15 RX ORDER — ALBUTEROL 90 UG/1
2 AEROSOL, METERED ORAL EVERY 6 HOURS
Refills: 0 | Status: DISCONTINUED | OUTPATIENT
Start: 2020-09-15 | End: 2020-09-18

## 2020-09-15 RX ADMIN — METFORMIN HYDROCHLORIDE 500 MILLIGRAM(S): 850 TABLET ORAL at 09:40

## 2020-09-15 RX ADMIN — METFORMIN HYDROCHLORIDE 500 MILLIGRAM(S): 850 TABLET ORAL at 21:32

## 2020-09-15 NOTE — CHART NOTE - NSCHARTNOTEFT_GEN_A_CORE
Screening Medical Evaluation  Patient Admitted from: [facility] ED    [facility] admitting diagnosis: Recurrent major depressive disorder    PAST MEDICAL & SURGICAL HISTORY:  DM (diabetes mellitus)    HTN (hypertension)    Schizophrenia          Allergies    No Known Drug Allergies  Tomatoes (Hives)    Intolerances        Social History:     FAMILY HISTORY:      MEDICATIONS  (STANDING):  buPROPion  milliGRAM(s) Oral at bedtime  haloperidol     Tablet 5 milliGRAM(s) Oral at bedtime  insulin lispro (HumaLOG) corrective regimen sliding scale   SubCutaneous three times a day before meals  metFORMIN 500 milliGRAM(s) Oral two times a day    MEDICATIONS  (PRN):  ALBUTerol    90 MICROgram(s) HFA Inhaler 2 Puff(s) Inhalation every 6 hours PRN asthma  dextrose 40% Gel 15 Gram(s) Oral once PRN Blood Glucose LESS THAN 70 milliGRAM(s)/deciliter  diphenhydrAMINE 50 milliGRAM(s) Oral every 6 hours PRN severe agitation/EPS  diphenhydrAMINE   Injectable 50 milliGRAM(s) IntraMuscular once PRN severe agitation/EPS  haloperidol     Tablet 5 milliGRAM(s) Oral every 6 hours PRN severe agitation  haloperidol    Injectable 5 milliGRAM(s) IntraMuscular once PRN severe agitation  LORazepam     Tablet 2 milliGRAM(s) Oral every 6 hours PRN severe agitation  LORazepam   Injectable 2 milliGRAM(s) IntraMuscular once PRN severe agitaion      Vital Signs Last 24 Hrs  T(C): 36.3 (15 Sep 2020 19:21), Max: 36.8 (15 Sep 2020 03:35)  T(F): 97.3 (15 Sep 2020 19:21), Max: 98.2 (15 Sep 2020 03:35)  HR: 61 (15 Sep 2020 08:00) (61 - 70)  BP: 125/78 (15 Sep 2020 08:00) (125/78 - 130/78)  BP(mean): --  RR: 16 (15 Sep 2020 03:35) (16 - 17)  SpO2: 97% (15 Sep 2020 03:35) (97% - 98%)  CAPILLARY BLOOD GLUCOSE      POCT Blood Glucose.: 102 mg/dL (15 Sep 2020 20:08)  POCT Blood Glucose.: 99 mg/dL (15 Sep 2020 16:35)  POCT Blood Glucose.: 117 mg/dL (15 Sep 2020 07:42)        PHYSICAL EXAM:  GENERAL: NAD, well-developed  HEAD:  Atraumatic, Normocephalic  EYES: EOMI, PERRLA, conjunctiva and sclera clear  NECK: Supple.  CHEST/LUNG: Clear to auscultation bilaterally; No wheeze  HEART: Regular rate and rhythm; No murmurs, rubs, or gallops  ABDOMEN: Soft, Nontender, Nondistended; Bowel sounds present  EXTREMITIES:  2+ Peripheral Pulses, No cyanosis, or edema  PSYCH: AAOx3  NEUROLOGY: non-focal  SKIN: No rashes or lesions    LABS:                        16.1   5.89  )-----------( 165      ( 14 Sep 2020 19:37 )             50.4     09-14    141  |  103  |  12  ----------------------------<  95  4.2   |  27  |  1.00    Ca    9.5      14 Sep 2020 19:37            Urinalysis Basic - ( 14 Sep 2020 19:37 )    Color: Yellow / Appearance: Clear / S.020 / pH: x  Gluc: x / Ketone: NEGATIVE  / Bili: Negative / Urobili: 0.2 E.U./dL   Blood: x / Protein: NEGATIVE mg/dL / Nitrite: NEGATIVE   Leuk Esterase: NEGATIVE / RBC: x / WBC x   Sq Epi: x / Non Sq Epi: x / Bacteria: x        RADIOLOGY & ADDITIONAL TESTS:    Assessment and Plan:  49 year old male presenting today from [facility] ED to [facility] with admitting diagnosis of Recurrent major depressive disorder with PMH of HTN, asthma, and DM. Denies any fever, chills, headache, chest pain , SOB, abdominal pain, N/V/D/C, dysuria. Physical exam unremarkable. CBC, BMP, UA WNL. Drug tox negative. COVID19 negative.   1) Recurrent major depressive disorder: Follow care plan as per primary team.   2) DM: continue metformin 500mg BID and insulin sliding scale TID before meals.  3) asthma: Continue albuterol inhaler prn.  4) HTN: Continue to monitor BP daily off anti-hypertensives and low sodium DASH diet.

## 2020-09-16 PROCEDURE — 99231 SBSQ HOSP IP/OBS SF/LOW 25: CPT

## 2020-09-16 PROCEDURE — 93010 ELECTROCARDIOGRAM REPORT: CPT

## 2020-09-17 PROCEDURE — 99231 SBSQ HOSP IP/OBS SF/LOW 25: CPT

## 2020-09-17 RX ORDER — HALOPERIDOL DECANOATE 100 MG/ML
1 INJECTION INTRAMUSCULAR
Qty: 30 | Refills: 0
Start: 2020-09-17 | End: 2020-10-16

## 2020-09-17 RX ORDER — BUPROPION HYDROCHLORIDE 150 MG/1
1 TABLET, EXTENDED RELEASE ORAL
Qty: 30 | Refills: 0
Start: 2020-09-17 | End: 2020-10-16

## 2020-09-17 RX ORDER — METFORMIN HYDROCHLORIDE 850 MG/1
1 TABLET ORAL
Qty: 60 | Refills: 0
Start: 2020-09-17 | End: 2020-10-16

## 2020-09-17 RX ORDER — ATORVASTATIN CALCIUM 80 MG/1
1 TABLET, FILM COATED ORAL
Qty: 30 | Refills: 0
Start: 2020-09-17 | End: 2020-10-16

## 2020-09-17 RX ORDER — LOSARTAN POTASSIUM 100 MG/1
1 TABLET, FILM COATED ORAL
Qty: 30 | Refills: 0
Start: 2020-09-17 | End: 2020-10-16

## 2020-09-17 RX ADMIN — METFORMIN HYDROCHLORIDE 500 MILLIGRAM(S): 850 TABLET ORAL at 20:46

## 2020-09-18 VITALS — TEMPERATURE: 98 F

## 2020-09-18 PROBLEM — E11.9 TYPE 2 DIABETES MELLITUS WITHOUT COMPLICATIONS: Chronic | Status: ACTIVE | Noted: 2020-08-23

## 2020-09-18 PROBLEM — F20.9 SCHIZOPHRENIA, UNSPECIFIED: Chronic | Status: ACTIVE | Noted: 2020-08-23

## 2020-09-18 PROBLEM — I10 ESSENTIAL (PRIMARY) HYPERTENSION: Chronic | Status: ACTIVE | Noted: 2020-08-23

## 2020-09-18 PROCEDURE — 99238 HOSP IP/OBS DSCHRG MGMT 30/<: CPT

## 2020-09-18 RX ADMIN — METFORMIN HYDROCHLORIDE 500 MILLIGRAM(S): 850 TABLET ORAL at 09:04

## 2021-06-01 NOTE — ED ADULT NURSE NOTE - ORIENTED TO PERSON
Patient presenting with progression of cellulitis. No systemic signs or symptoms. Given rapid progression of borders, to start on IV abx. Patient presenting with progression of cellulitis. No signs of collection or abscess. No systemic signs or symptoms. Given rapid progression of borders, to start on IV abx. Yes

## 2022-08-10 NOTE — ED PROVIDER NOTE - NS ED MD DISPO DISCHARGE CCDA
From: Gabrielle Goss  To: Georgie Perry MD  Sent: 8/10/2022 12:43 PM CDT  Subject: Meds    I need a refill on the trazadone 150mg (3-50mg)  At night.  Please and thank you
Patient/Caregiver provided printed discharge information.

## 2023-10-07 NOTE — ED ADULT NURSE NOTE - BREATHING, MLM
pt c/o back pain after reaching for something in car yesterday. pain uncontrolled with tylenol, pt vomited this morning. denies headache or nausea at this time. pt family recently had COVID, pt afebrile in triage. Spontaneous, unlabored and symmetrical
